# Patient Record
Sex: MALE | Race: OTHER | URBAN - METROPOLITAN AREA
[De-identification: names, ages, dates, MRNs, and addresses within clinical notes are randomized per-mention and may not be internally consistent; named-entity substitution may affect disease eponyms.]

---

## 2017-03-26 ENCOUNTER — EMERGENCY (EMERGENCY)
Facility: HOSPITAL | Age: 32
LOS: 0 days | Discharge: HOME | End: 2017-03-26
Attending: EMERGENCY MEDICINE
Payer: COMMERCIAL

## 2017-03-26 VITALS
RESPIRATION RATE: 17 BRPM | SYSTOLIC BLOOD PRESSURE: 118 MMHG | DIASTOLIC BLOOD PRESSURE: 73 MMHG | OXYGEN SATURATION: 100 % | HEART RATE: 85 BPM | TEMPERATURE: 98 F

## 2017-03-26 VITALS
SYSTOLIC BLOOD PRESSURE: 120 MMHG | OXYGEN SATURATION: 100 % | HEART RATE: 91 BPM | HEIGHT: 63 IN | RESPIRATION RATE: 18 BRPM | TEMPERATURE: 98 F | DIASTOLIC BLOOD PRESSURE: 72 MMHG | WEIGHT: 177.91 LBS

## 2017-03-26 DIAGNOSIS — H10.9 UNSPECIFIED CONJUNCTIVITIS: ICD-10-CM

## 2017-03-26 PROCEDURE — 99283 EMERGENCY DEPT VISIT LOW MDM: CPT | Mod: 25

## 2017-03-26 NOTE — ED PROVIDER NOTE - MEDICAL DECISION MAKING DETAILS
patient with conjunctivis. He was provided with ocuflox. patient now discharged with care instructions. he is to follow up with optho.

## 2017-03-26 NOTE — ED PROVIDER NOTE - OBJECTIVE STATEMENT
Pertinent PMH/PSH/FHx/SHx and Review of Systems contained within:  32 yo m with no PMH presents in ED c/o bilateral eye lacrimation, pain and itching. No aggravating or relieving factors, No fever/chills, No photophobia/eye pain/changes in vision, No ear pain/sore throat/dysphagia, No chest pain/palpitations, no SOB/cough/wheeze/stridor, No abdominal pain, No N/V/D, no dysuria/frequency/discharge, No neck/back pain, no rash, no changes in neurological status/function.

## 2020-03-20 ENCOUNTER — EMERGENCY (EMERGENCY)
Facility: HOSPITAL | Age: 35
LOS: 0 days | Discharge: HOME | End: 2020-03-20
Attending: EMERGENCY MEDICINE
Payer: COMMERCIAL

## 2020-03-20 VITALS
WEIGHT: 179.9 LBS | DIASTOLIC BLOOD PRESSURE: 77 MMHG | RESPIRATION RATE: 19 BRPM | HEART RATE: 120 BPM | HEIGHT: 63 IN | SYSTOLIC BLOOD PRESSURE: 120 MMHG | TEMPERATURE: 102 F | OXYGEN SATURATION: 98 %

## 2020-03-20 DIAGNOSIS — J06.9 ACUTE UPPER RESPIRATORY INFECTION, UNSPECIFIED: ICD-10-CM

## 2020-03-20 DIAGNOSIS — J11.1 INFLUENZA DUE TO UNIDENTIFIED INFLUENZA VIRUS WITH OTHER RESPIRATORY MANIFESTATIONS: ICD-10-CM

## 2020-03-20 DIAGNOSIS — J45.909 UNSPECIFIED ASTHMA, UNCOMPLICATED: ICD-10-CM

## 2020-03-20 PROCEDURE — 99284 EMERGENCY DEPT VISIT MOD MDM: CPT

## 2020-03-20 RX ORDER — ACETAMINOPHEN 500 MG
975 TABLET ORAL ONCE
Refills: 0 | Status: DISCONTINUED | OUTPATIENT
Start: 2020-03-20 | End: 2020-03-20

## 2020-03-20 NOTE — ED ADULT TRIAGE NOTE - NS ED NURSE DIRECT TO ROOM YN
Alia Ballard is here today for Follow-up (Weight loss, medicatin refills.)    Concerns/symptoms: Listed above  Medications: medications verified and updated  Refills needed today? Yes, medications pended     Tobacco history: verified  Advanced Directives: No not on file, not interested. BP greater than 140/90? No    Patient would like communication of their results via:      Cell Phone:   Telephone Information:   Mobile [phone]   Mobile Not on file. Okay to leave a message containing results? Yes  Preferred language:  English. Health Maintenance Due   Topic Date Due   â¢ DTaP/Tdap/Td Vaccine (1 - Tdap) 05/05/2008   â¢ Shingles Vaccine (1 of 2) 02/07/2013   â¢ Lung Cancer Screening  02/07/2018      Patient is due for topics as listed above but is not proceeding with Immunization(s) Dtap/Tdap/Td at this time. Patient states that insurance does not cover tdap.     Medicare HRA:              PHQ 2:  Date Adult PHQ 2 Score   2/5/2020 0       PHQ 9:  Date Adult PHQ 9 Score   6/20/2019 17 Yes

## 2020-03-20 NOTE — ED PROVIDER NOTE - PATIENT PORTAL LINK FT
You can access the FollowMyHealth Patient Portal offered by [facility] by registering at the following website: [email or web address]. By joining [facility]’s FollowMyHealth portal, you will also be able to view your health information using other applications (apps) compatible with our system.

## 2020-03-20 NOTE — ED PROVIDER NOTE - OBJECTIVE STATEMENT
35 y/o M with PMHx of asthma presents to the ED with cough and fever since 2 days ago. Pt also complains of body aches to arms. Pt reports contact with sick mother. Pt denies travel, n/v/d, chills or any other medical problems. NKDA.

## 2020-03-20 NOTE — ED ADULT NURSE NOTE - NSIMPLEMENTINTERV_GEN_ALL_ED
Implemented All Fall with Harm Risk Interventions:  [place] to call system. Call bell, personal items and telephone within reach. Instruct patient to call for assistance. Room bathroom lighting operational. Non-slip footwear when patient is off stretcher. Physically safe environment: no spills, clutter or unnecessary equipment. Stretcher in lowest position, wheels locked, appropriate side rails in place. Provide visual cue, wrist band, yellow gown, etc. Monitor gait and stability. Monitor for mental status changes and reorient to person, place, and time. Review medications for side effects contributing to fall risk. Reinforce activity limits and safety measures with patient and family. Provide visual clues: red socks.

## 2020-03-20 NOTE — ED PROVIDER NOTE - CLINICAL SUMMARY MEDICAL DECISION MAKING FREE TEXT BOX
Pt will not be offered COVID-19 at this time. No need for hospital admission. Pt advised supportive care and recommended to increase hydration. Pt advised to return if experiences SOB.

## 2020-11-02 NOTE — ED ADULT NURSE NOTE - NS ED NOTE  TALK SOMEONE YN
----- Message from Edgardo Vance sent at 11/2/2020 11:46 AM CST -----  Type: Needs Medical Advice  Who Called:  Jessica Nagy ([place])    Best Call Back Number: [phone]  Additional Information: Caller states that she would like a callback regarding scheduling an appointment for the patient.       No

## 2023-02-23 NOTE — ED ADULT NURSE NOTE - CHIEF COMPLAINT QUOTE
redness ane swelling to both eye What Is The Reason For Today's Visit?: History of Non-Melanoma Skin Cancer How Many Skin Cancers Have You Had?: one When Was Your Last Cancer Diagnosed?: 08/2022

## 2023-07-18 NOTE — ED PROVIDER NOTE - CCCP TRG CHIEF CMPLNT
Assessment:  · Patient presented with hemoglobin 7.0 on admission with last recorded hemoglobin 10.3 in 9/2022  · Stool guaiac "mildly positive" in ED  · GI consulted appreciated input currently on clear liquids. · Patient s/p 1 unit PRBC. Denies actively bleeding  · Iron panel showing low saturation, low iron and normal TIBC 400  · today's hemoglobin is 7.2  · EGD grossly unremarkable  • Colonoscopy revealed 2 small angioectasias in the ascending colon; no bleeding was identified; induced coagulation and hemostasis achieved with 3 applications of with argon plasma coagulation  • Small diverticula of mild severity in the transverse colon, descending colon, sigmoid colon and rectosigmoid  • Internal small hemorrhoids. Anal tags noted on retroflexion. · One flat, adenomatous-appearing polyp measuring smaller than 5 mm in the rectum; completely removed en bloc by cold snare and retrieved specimen    Plan:  · Transfuse if hemoglobin less than 7.    · CBC every 24 hours  · Observe the patient, follow-up on hemoglobin levels.   · Outpatient follow-up with GI flu-like symptoms

## 2023-12-22 NOTE — ED ADULT TRIAGE NOTE - PAIN RATING/NUMBER SCALE (0-10): REST
"Medication requested: propranolol    Refill decision: Medication discontinued. M for [facility] #[id number]    12/21/2023  [facility]     Marixa Kay, BENITO CNP  Psychiatry     DISCONTINUE Propranolol 10 mg twice daily as needed for anxiety/panic   \"Viktoria feels propranolol is no longer as helpful as it used to be. Will discontinue and switch to clonidine 0.1 mg at bedtime. \"      "
3